# Patient Record
Sex: FEMALE | Race: BLACK OR AFRICAN AMERICAN | Employment: UNEMPLOYED | URBAN - METROPOLITAN AREA
[De-identification: names, ages, dates, MRNs, and addresses within clinical notes are randomized per-mention and may not be internally consistent; named-entity substitution may affect disease eponyms.]

---

## 2022-10-21 ENCOUNTER — HOSPITAL ENCOUNTER (EMERGENCY)
Age: 62
Discharge: HOME | End: 2022-10-21
Attending: EMERGENCY MEDICINE
Payer: COMMERCIAL

## 2022-10-21 VITALS
SYSTOLIC BLOOD PRESSURE: 144 MMHG | WEIGHT: 158.6 LBS | RESPIRATION RATE: 12 BRPM | BODY MASS INDEX: 26.42 KG/M2 | TEMPERATURE: 98.4 F | HEART RATE: 93 BPM | OXYGEN SATURATION: 97 % | HEIGHT: 65 IN | DIASTOLIC BLOOD PRESSURE: 87 MMHG

## 2022-10-21 DIAGNOSIS — J20.9 ACUTE BRONCHITIS, UNSPECIFIED ORGANISM: Primary | ICD-10-CM

## 2022-10-21 PROCEDURE — 99283 EMERGENCY DEPT VISIT LOW MDM: CPT

## 2022-10-21 RX ORDER — DEXTROMETHORPHAN POLISTIREX 30 MG/5ML
60 SUSPENSION ORAL 2 TIMES DAILY PRN
Qty: 89 ML | Refills: 1 | Status: SHIPPED | OUTPATIENT
Start: 2022-10-21 | End: 2022-10-28

## 2022-10-21 RX ORDER — AZITHROMYCIN 250 MG/1
TABLET, FILM COATED ORAL
Qty: 6 TABLET | Refills: 0 | Status: SHIPPED | OUTPATIENT
Start: 2022-10-21 | End: 2022-10-31

## 2022-10-21 RX ORDER — ASPIRIN 81 MG/1
81 TABLET, CHEWABLE ORAL DAILY
COMMUNITY

## 2022-10-21 ASSESSMENT — PAIN DESCRIPTION - DESCRIPTORS: DESCRIPTORS: DISCOMFORT

## 2022-10-21 ASSESSMENT — PAIN DESCRIPTION - PAIN TYPE: TYPE: ACUTE PAIN

## 2022-10-21 ASSESSMENT — PAIN SCALES - GENERAL: PAINLEVEL_OUTOF10: 8

## 2022-10-21 ASSESSMENT — PAIN DESCRIPTION - ORIENTATION: ORIENTATION: MID

## 2022-10-21 ASSESSMENT — PAIN DESCRIPTION - FREQUENCY: FREQUENCY: INTERMITTENT

## 2022-10-21 ASSESSMENT — PAIN - FUNCTIONAL ASSESSMENT: PAIN_FUNCTIONAL_ASSESSMENT: 0-10

## 2022-10-21 ASSESSMENT — PAIN DESCRIPTION - LOCATION: LOCATION: CHEST

## 2022-10-21 NOTE — DISCHARGE INSTRUCTIONS
You should try to stop smoking. Take the azithromycin antibiotic as directed until gone. Take the Delsym twice daily as directed if needed for cough. Push fluids. Follow-up with primary care.

## 2022-10-21 NOTE — ED NOTES
Pt is c/o non productive cough with sneezing for the past 3 days and pt unsure if she had fever at home. Pt is c/o pain in her upper chest when she coughs.       Selby Apley, RN  10/21/22 [id number]

## 2022-10-21 NOTE — ED PROVIDER NOTES
TRIAGE CHIEF COMPLAINT:   Chief Complaint   Patient presents with    Cough     Non productive cough for the past 3 days          HPI: Ronald Menchaca is a 63 y.o. female who presents to the Emergency Department with complaint of nonproductive cough for the past 3 days. She had subjective fever yesterday but no chills. Denies shortness of breath or chest pain. She has not taken any medication for her symptoms. Denies earache or sore throat. She has smoked half a pack per day for 25 years. No history of lung disease. REVIEW OF SYSTEMS:  6 systems reviewed. Pertinent positives per HPI. Otherwise noted to be negative. Nursing notes reviewed and agree with above. Past medical/surgical history reviewed. MEDICATIONS   Patient's Medications   New Prescriptions    No medications on file   Previous Medications    ASPIRIN (ASPIRIN 81) 81 MG CHEWABLE TABLET    Take 81 mg by mouth daily    ATORVASTATIN CALCIUM PO    Take by mouth    LISINOPRIL PO    Take by mouth   Modified Medications    No medications on file   Discontinued Medications    No medications on file         ALLERGIES No Known Allergies      BP (!) 144/87   Pulse 93   Temp 98.4 °F (36.9 °C) (Oral)   Resp 12   Ht 5' 5\" (1.651 m)   Wt 158 lb 9.6 oz (71.9 kg)   SpO2 97%   BMI 26.39 kg/m²   General:  No acute distress. Non toxic appearance  Head:   Normocephalic and atraumatic  Eyes:   Conjunctiva clear, ROSALIO, EOM's intact. Sclera anicteric. ENT:   Mucous membranes moist.  TMs are normal.  Nose and oropharynx are clear. Neck:   Supple. No adenopathy. Lungs/Chest:  No respiratory distress. Lungs are clear bilaterally. CVS:   Regular rate and rhythm  Abdomen:  Nontender  Extremities:  Full range of motion  Skin:   No rashes or lesions to exposed skin  Back:   Nontender  Neuro:  Alert and OX3. Speech clear and appropriate. No upper/lower extremity weakness. Normal sensation in all extremities. No facial asymmetry or weakness.  Gait normal.  Psych:   Affect normal. Mood normal        RADIOLOGY:      LAB      ED COURSE / MDM:  59-year-old female with complaints of nonproductive cough and subjective fever denies shortness of breath or chest pain. She is a longtime smoker but states she has never been diagnosed with lung disease. I suspect she does have some element of COPD. She is afebrile with normal room air sat and normal heart rate. She was given a prescription for Delsym for cough and azithromycin. Recommended Tylenol or ibuprofen as needed. Advised increase fluids. Recommended follow-up with primary care. I discussed with Lou Buitrago the results of the evaluation in the Emergency Department, diagnosis, care, prognosis and the importance of follow-up. The patient is stable for discharge. The patient and/or family are in agreement with the plan and all questions have been answered. Specific discharge instructions were explained, including reasons to return to the emergency department.       (Please note that portions of this note may have been completed with a voice recognition program.  Efforts were made to edit the dictation but occasionally words are mis-transcribed)        FINAL IMPRESSION:  1 --acute bronchitis                   Darrell Wiseman MD  10/23/22 1231